# Patient Record
Sex: FEMALE | Race: WHITE | NOT HISPANIC OR LATINO | Employment: OTHER | URBAN - METROPOLITAN AREA
[De-identification: names, ages, dates, MRNs, and addresses within clinical notes are randomized per-mention and may not be internally consistent; named-entity substitution may affect disease eponyms.]

---

## 2020-02-26 ENCOUNTER — HOSPITAL ENCOUNTER (OUTPATIENT)
Dept: MEDSURG UNIT | Facility: HOSPITAL | Age: 85
End: 2020-02-28
Attending: INTERNAL MEDICINE | Admitting: INTERNAL MEDICINE

## 2020-05-30 ENCOUNTER — HISTORICAL (OUTPATIENT)
Dept: ADMINISTRATIVE | Facility: HOSPITAL | Age: 85
End: 2020-05-30

## 2022-02-02 ENCOUNTER — HISTORICAL (OUTPATIENT)
Dept: ADMINISTRATIVE | Facility: HOSPITAL | Age: 87
End: 2022-02-02

## 2022-03-03 ENCOUNTER — HISTORICAL (OUTPATIENT)
Dept: ADMINISTRATIVE | Facility: HOSPITAL | Age: 87
End: 2022-03-03

## 2022-03-16 ENCOUNTER — HISTORICAL (OUTPATIENT)
Dept: ADMINISTRATIVE | Facility: HOSPITAL | Age: 87
End: 2022-03-16

## 2022-05-01 NOTE — HISTORICAL [facility] C[facility]
This is a historical note converted from Valentina. Formatting and pictures may have been removed.  Please reference Ceramanda for original formatting and attached multimedia. 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  Admission Information  Patient is a 85 yo F with PMH of encephalitis approximately 18 years ago resulting in dementia, hypothyroidism, HTN, HLD, DM2, peripheral neuropathy, osteoporosis, depression, and GERD who presented to the [facility] ED on 2/26 after being transferred from [place]. Routine labs at her nursing home showed H/H of 6.9/23.4. She was transfused 2 units of RBCs after admission to [facility]. Patient has limited ability to provide history based on her dementia.  Significant Findings  -Patient had iron deficiency anemia on admission with low iron and ferritin levels and H/H of 6.9/23.4  -Patient initially was given 2 units of RBCs and H/H improved to 9/29.7 which remained stable after transfusion  -Patient was deemed to likely have a poor prep for colonoscopy due to her dementia so EGD was planned for 2/28 to identify potential acute bleeding sources  -EGD performed on 2/28 showed a 1 cm semi-pedunculated polyp in the antrum which was removed by snare  cautery polypectomy and retrieved with a [place] net. Linear erythema was also noted consistent with gastritis  ?  Time Spent on discharge  >30 min  Objective  Vitals & Measurements  T:?36.6? ?C (Oral)? TMIN:?36.6? ?C (Oral)? TMAX:?36.7? ?C (Oral)? HR:?61(Peripheral)? HR:?62(Monitored)? RR:?18? BP:?135/62? SpO2:?96%?  Physical Exam  See progress note from 2/28/20  Patient Discharge Condition  Stable and improved  Discharge Disposition  ?  Patient is stable for return to her nursing home and the care of her nursing home physician. Patient?and family have been recommended to have nursing home physician continue to follow patients hemoglobin levels and then consider colonoscopy if there is concern for rebleeding as the removed polyp was deemed unlikely to be a major bleeding source. Pathology from?polyp removed by EGD will be followed up on outpatient if necessary. Patient has been recommended to continue a soft diet for the remainder of today when she returns to her nursing home.  ?  ?   Discharge Medication Reconciliation  ?  Continue  alendronate (alendronate 70 mg oral tablet)?70 mg, Oral, qWeek  calcium-vitamin D (Caltrate 600+D oral tablet, chewable)?1 tab(s), Chewed, BID  citalopram (Celexa 10 mg oral tablet)?10 mg, Oral, Daily  ezetimibe-simvastatin (Vytorin 10 mg-40 mg oral tablet)?1 tab(s), Oral, Daily  gabapentin (Neurontin 100 mg oral capsule)?2 capsules, Oral, At Bedtime  levothyroxine (levothyroxine 88 mcg (0.088 mg) oral tablet)?88 mcg, Oral, Daily  lisinopril (lisinopril 5 mg oral tablet)?5 mg, Oral, Daily  metFORMIN (metFORMIN 1000 mg oral tablet)?1,000 mg, Oral, BID  metoprolol (Metoprolol Succinate ER 25 mg oral tablet extended release)?25 mg, Oral, Daily  pioglitazone (Actos 45 mg oral tablet)?45 mg, Oral, Daily  potassium chloride (potassium chloride 20 mEq oral TABLET extended release)?20 mEq, Oral, Daily  rivastigmine (Exelon 13.3 mg/24 hr transdermal film, extended release)?1 patch(es), TOP,  Daily  omeprazole (omeprazole 20 mg oral DR capsule)?20 mg, Oral, Daily  Education and Orders Provided  Esophagogastroduodenoscopy  Colonoscopy, Adult  Anemia  Discharge - 02/28/20 15:10:00 CST, Dis/Trn Another Type Inst not defined, Discharge to nursing home?  Follow up  Report to Emergency Department if symptoms return or worsen  Follow up with nursing home physician  ????  Continue to monitor patient for anemia and recommend colonoscopy if patient rebleeds

## 2022-05-01 NOTE — HISTORICAL [facility] C[facility]
This is a historical note converted from Valentina. Formatting and pictures may have been removed.  Please reference Ceramanda for original formatting and attached multimedia. Chief Complaint  Transfer from [facility] for Med/Surg admit for anemia. Pt tomy c/o  Reason for Consultation  Iron deficiency anemia  History of Present Illness  Fernanda Oconnor is a 84-year-old female with past medical history of encephalitis resulting in dementia, hypothyroidism, hypertension, hyperlipidemia, type 2 diabetes, peripheral neuropathy, osteoporosis, depression, and GERD who presented to [facility] ED on 2/26 as a transfer from [place].? Routine blood work at her nursing home revealed a H/H of 6.9/23.4, which was confirmed with labs at [facility].? She was transfused 2 units PRBCs with appropriate response in hemoglobin. Labs from this morning indicate iron deficiency anemia. GI consulted for possible EGD and colonoscopy to workup her anemia. Patient able to answer questions, but is a poor historian due to dementia. History obtained from chart review and son as a result.  ?  On exam, patient appears well. States that her BMs are the color of the foods she eats (brown, green, white). Denies any recent weight loss. Denies any nausea or vomiting. Good appetite and diet per son. 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 Never had a EGD or colonoscopy. No family?history of GI cancers or IBD.  Review of Systems  Unable to fully obtain secondary to clinical condition.  Physical Exam  Vitals & Measurements  T:?36.8? ?C (Oral)? TMIN:?36.3? ?C (Oral)? TMAX:?37.0? ?C (Oral)? HR:?73(Peripheral)? RR:?18? BP:?154/73? SpO2:?97%? WT:?83?kg? BMI:?31.63?  General: Well appearing?female in NAD. AAO x 1 (place)  HEENT: Normocephalic, atraumatic.  Neck: Supple, non-tender. No lymphadenopathy or thyromegaly.  Cardiovascular: RRR, normal S1, S2. No murmurs, rubs, or extra heart sounds noted.  Respiratory: CTABL. No rales, wheezes, or rhonchi.  Abdomen: Tender  to palpation in the RUQ, non-distended. Normoactive bowel sounds. No hepatosplenomegaly.  Extremities: No clubbing, cyanosis, or edema noted. Normal ROM.  Skin: No rashes or lesions noted.  Assessment/Plan  1. Iron deficiency anemia  -Initial H/H of 6.8/23.0, with appropriate response to transfusion  -Denies any melena, hematochezia, or hematemesis, but patient is unreliable historian  -Iron studies and severely low ferritin indicate iron deficiency anemia  -Patient has already eaten solid food today. Bowel prep would likely be poor.  -Will plan on EGD on 2/28 to evaluate  -If continues to have bleeding will consider colonoscopy as outpatient.  -Goals of care discussion held with the family. Son (medical power of )?would like all measures pursued at this time  ?  Thank you for this consultation.  ?  Angel Padilla MD  IM?[id number]  Pager [phone]   Problem List/Past Medical History  Ongoing  Alzheimer disease  Obesity  Historical  No qualifying data  Medications  Inpatient  acetaminophen, 650 mg= 2 tab(s), Oral, q6hr, PRN  acetaminophen, 1000 mg= 2 tab(s), Oral, q6hr, PRN  alendronate 70 mg oral tablet, 70 mg, Oral, qWeek  Celexa 10 mg oral tablet, 10 mg= 1 tab(s), Oral, Daily  Dextrose 50% and Water (50 mL vial/syringe), 12.5 gm= 25 mL, IV Push, Once, PRN  Dextrose 50% and Water (50 mL vial/syringe), 12.5 gm= 25 mL, IV Push, As Directed, PRN  Dextrose 50% and Water (50 mL vial/syringe), 25 gm= 50 mL, IV Push, As Directed, PRN  Dextrose 50% in Water intravenous solution, 12.5 gm= 25 mL, IV Push, As Directed, PRN  Exelon 13.3 mg/24 hr transdermal film, extended release, 13.3 mg, TOP, Daily  glucagon recombinant 1 mg injection, 1 mg= 1 EA, IM, q10min, PRN  glucagon recombinant 1 mg injection, 1 mg= 1 EA, IM, q10min, PRN  glucose 40% oral gel, 15 gm= 0.5 tube(s), Oral, As Directed, PRN  insulin lispro 100 units/mL subcutaneous injection, 2-14 units, Subcutaneous, As Directed, PRN  levothyroxine 88 mcg (0.088 mg) oral  tablet, 88 mcg= 1 tab(s), Oral, Daily  lisinopril 5 mg oral tablet, 5 mg= 1 tab(s), Oral, Daily  Lovenox, 40 mg= 0.4 mL, Subcutaneous, Daily  Metoprolol Succinate ER 25 mg oral tablet extended release, 25 mg= 1 tab(s), Oral, Daily  Neurontin 100 mg oral capsule, 200 mg= 2 cap(s), Oral, At Bedtime  potassium chloride 20 mEq oral TABLET extended release, 20 mEq= 1 tab(s), Oral, Daily  Protonix 40 mg ORAL enteric coated tablet, 40 mg= 1 tab(s), Oral, Daily  Home  Actos 45 mg oral tablet, 45 mg= 1 tab(s), Oral, Daily  alendronate 70 mg oral tablet, 70 mg= 1 tab(s), Oral, qWeek  Caltrate 600+D oral tablet, chewable, 1 tab(s), Chewed, BID  Celexa 10 mg oral tablet, 10 mg= 1 tab(s), Oral, Daily  Exelon 13.3 mg/24 hr transdermal film, extended release, 1 patch(es), TOP, Daily  levothyroxine 88 mcg (0.088 mg) oral tablet, 88 mcg= 1 tab(s), Oral, Daily  lisinopril 5 mg oral tablet, 5 mg= 1 tab(s), Oral, Daily  metFORMIN 1000 mg oral tablet, 1000 mg= 1 tab(s), Oral, BID  Metoprolol Succinate ER 25 mg oral tablet extended release, 25 mg= 1 tab(s), Oral, Daily  Neurontin 100 mg oral capsule, 2 capsules, Oral, At Bedtime  omeprazole 20 mg oral DR capsule, 20 mg= 1 cap(s), Oral, Daily  potassium chloride 20 mEq oral TABLET extended release, 20 mEq= 1 tab(s), Oral, Daily  Vytorin 10 mg-40 mg oral tablet, 1 tab(s), Oral, Daily  Allergies  No Known Medication Allergies  Social History  Abuse/Neglect  No, 02/27/2020  Alcohol  Never, 08/27/2016  Substance Use  Never, 08/27/2016  Tobacco  Never (less than 100 in lifetime), N/A, 02/27/2020  Never (less than 100 in lifetime), No, 02/26/2020  Never smoker, 08/27/2016  Lab Results  Labs Last 24 Hours?  ?Chemistry? Hematology/Coagulation?   Sodium Lvl: 139 mmol/L (02/27/20 09:18:00) PT: 12.5 second(s) (02/27/20 09:18:00)   Potassium Lvl: 4.1 mmol/L (02/27/20 09:18:00) INR: 0.94 (02/27/20 09:18:00)   Chloride:?109 mmol/L?High (02/27/20 09:18:00) PTT: 27.3 second(s) (02/26/20 21:40:00)   CO2:  26 mmol/L (02/27/20 09:18:00) WBC: 6.2 x10(3)/mcL (02/27/20 09:18:00)   Calcium Lvl: 9.3 mg/dL (02/27/20 09:18:00) RBC: 4.02 x10(6)/mcL (02/27/20 09:18:00)   Glucose Lvl:?163 mg/dL?High (02/27/20 09:18:00) Hgb:?9 gm/dL?Low (02/27/20 09:18:00)   EAG:?183 mg/dL?High (02/27/20 12:51:00) Hct:?29.7 %?Low (02/27/20 09:18:00)   BUN:?25 mg/dL?High (02/27/20 09:18:00) Platelet: 296 x10(3)/mcL (02/27/20 09:18:00)   Creatinine: 0.8 mg/dL (02/27/20 09:18:00) MCV:?73.9 fL?Low (02/27/20 09:18:00)   eGFR-AA:?88 mL/min?Low (02/27/20 09:18:00) MCH:?22.4 pg?Low (02/27/20 09:18:00)   eGFR-ANASTASIA:?73 mL/min?Low (02/27/20 09:18:00) MCHC:?30.3 gm/dL?Low (02/27/20 09:18:00)   Bili Total: 0.3 mg/dL (02/27/20 09:18:00) RDW:?21.4 %?High (02/27/20 09:18:00)   Bili Direct: 0.1 mg/dL (02/27/20 09:18:00) MPV: 10.4 fL (02/27/20 09:18:00)   Bili Indirect: 0.2 mg/dL (02/27/20 09:18:00) Abs Neut: 4.92 x10(3)/mcL (02/27/20 09:18:00)   AST:?11 unit/L?Low (02/27/20 09:18:00) Neutro Auto: 79 % (02/27/20 09:18:00)   ALT:?10 unit/L?Low (02/27/20 09:18:00) Lymph Auto: 13 % (02/27/20 09:18:00)   Alk Phos: 66 unit/L (02/27/20 09:18:00) Mono Auto: 6 % (02/27/20 09:18:00)   Total Protein:?6.2 gm/dL?Low (02/27/20 09:18:00) Eos Auto: 1 % (02/27/20 09:18:00)   Albumin Lvl:?2.9 gm/dL?Low (02/27/20 09:18:00) Abs Eos: 0.1 x10(3)/mcL (02/27/20 09:18:00)   Globulin: 3.3 gm/mL (02/27/20 09:18:00) Basophil Auto: 1 % (02/27/20 09:18:00)   A/G Ratio:?0.9 ratio?Low (02/27/20 09:18:00) Abs Neutro: 4.92 x10(3)/mcL (02/27/20 09:18:00)   LDH: 87 unit/L (02/26/20 23:50:10) Abs Lymph: 0.8 x10(3)/mcL (02/27/20 09:18:00)   NT pro BNP.:?1062 pg/mL?High (02/27/20 12:51:00) Abs Mono: 0.4 x10(3)/mcL (02/27/20 09:18:00)   Iron Lvl:?14 mcg/dL?Low (02/26/20 23:50:10) Abs Baso: 0 x10(3)/mcL (02/27/20 09:18:00)   Transferrin: 277 mg/dL (02/26/20 23:50:10) IG%: 0 % (02/27/20 09:18:00)   TIBC: 363 mcg/dL (02/26/20 23:50:10) IG#: 0.02 (02/27/20 09:18:00)   Iron Sat:?3.9 %?Low (02/26/20 23:50:10)  Retic Cnt Auto: 1 % (02/26/20 21:40:00)   Ferritin Lvl:?5.2 ng/mL?Low (02/26/20 21:40:00) RET#: 0.03 x10(6)/mcL (02/26/20 21:40:00)   Folate Lvl: 9.4 ng/mL (02/26/20 23:50:10)    Hgb A1c:?8 %?High (02/27/20 12:51:00)    Vitamin B12 Lvl: 732 pg/mL (02/26/20 23:50:10)    Haptoglobin:?206 mg/dL?High (02/26/20 23:50:10)    TSH: 2.307 mIU/L (02/27/20 12:51:00)    Diagnostic Results  Accession:?[id number]  Order:?XR Chest 2 Views  Report Dt/Tm:?02/27/2020 12:00  Report:?  ?  History:  Cardiomegaly  ?  Reference:  Yesterday  ?  Findings:  Frontal and lateral views of the chest were obtained. Stable  cardiomegaly. There are mild chronic changes of the lungs. No  consolidation suspicious for pneumonia. No pneumothorax.?  ?  Impression:?  No acute cardiopulmonary abnormality.  ?  ?  ?  ?  ?  Accession:?[id number]  Order:?XR Chest 1 View  Report Dt/Tm:?02/27/2020 09:13  Report:?  ?  1 view chest  ?  INDICATION: Dyspnea  ?  COMPARISON: 10/23/2007  ?  FINDINGS:?  ?  No focal consolidation, pleural effusion or pneumothorax. Cardiac  silhouette is enlarged. Pulmonary vasculature is normal.  ?  IMPRESSION:  ?  No acute findings in the chest  ?

## 2022-06-01 ENCOUNTER — HOSPITAL ENCOUNTER (EMERGENCY)
Facility: HOSPITAL | Age: 87
Discharge: ANOTHER HOSPITAL | End: 2022-06-01
Attending: FAMILY MEDICINE
Payer: MEDICARE

## 2022-06-01 VITALS
OXYGEN SATURATION: 97 % | SYSTOLIC BLOOD PRESSURE: 134 MMHG | WEIGHT: 200 LBS | TEMPERATURE: 99 F | HEART RATE: 75 BPM | BODY MASS INDEX: 32.14 KG/M2 | HEIGHT: 66 IN | DIASTOLIC BLOOD PRESSURE: 53 MMHG | RESPIRATION RATE: 18 BRPM

## 2022-06-01 DIAGNOSIS — N28.9 ACUTE RENAL INSUFFICIENCY: ICD-10-CM

## 2022-06-01 DIAGNOSIS — D62 ACUTE BLOOD LOSS ANEMIA: ICD-10-CM

## 2022-06-01 DIAGNOSIS — D64.9 SYMPTOMATIC ANEMIA: Primary | ICD-10-CM

## 2022-06-01 DIAGNOSIS — Z86.79 HISTORY OF CONGESTIVE HEART FAILURE: ICD-10-CM

## 2022-06-01 LAB
ABO + RH BLD: NORMAL
ABORH RETYPE: NORMAL
ALBUMIN SERPL-MCNC: 3.2 GM/DL (ref 3.4–4.8)
ALBUMIN/GLOB SERPL: 1.1 RATIO (ref 1.1–2)
ALP SERPL-CCNC: 68 UNIT/L (ref 40–150)
ALT SERPL-CCNC: <5 UNIT/L (ref 0–55)
AST SERPL-CCNC: 13 UNIT/L (ref 5–34)
BASOPHILS # BLD AUTO: 0.04 X10(3)/MCL (ref 0–0.2)
BASOPHILS NFR BLD AUTO: 0.7 %
BILIRUBIN DIRECT+TOT PNL SERPL-MCNC: 0.2 MG/DL
BLD PROD TYP BPU: NORMAL
BLOOD UNIT EXPIRATION DATE: NORMAL
BLOOD UNIT TYPE CODE: 5100
BUN SERPL-MCNC: 56 MG/DL (ref 9.8–20.1)
CALCIUM SERPL-MCNC: 10.7 MG/DL (ref 8.4–10.2)
CHLORIDE SERPL-SCNC: 107 MMOL/L (ref 98–107)
CO2 SERPL-SCNC: 21 MMOL/L (ref 23–31)
CREAT SERPL-MCNC: 1.88 MG/DL (ref 0.55–1.02)
CROSSMATCH INTERPRETATION: NORMAL
DISPENSE STATUS: NORMAL
EOSINOPHIL # BLD AUTO: 0.21 X10(3)/MCL (ref 0–0.9)
EOSINOPHIL NFR BLD AUTO: 3.4 %
ERYTHROCYTE [DISTWIDTH] IN BLOOD BY AUTOMATED COUNT: 20.6 % (ref 11.5–17)
FERRITIN SERPL-MCNC: 13.7 NG/ML (ref 4.63–204)
GLOBULIN SER-MCNC: 3 GM/DL (ref 2.4–3.5)
GLUCOSE SERPL-MCNC: 94 MG/DL (ref 82–115)
GROUP & RH: NORMAL
HCT VFR BLD AUTO: 22.2 % (ref 37–47)
HEMOCCULT SP1 STL QL: NEGATIVE
HGB BLD-MCNC: 6.7 GM/DL (ref 12–16)
IMM GRANULOCYTES # BLD AUTO: 0.03 X10(3)/MCL (ref 0–0.02)
IMM GRANULOCYTES NFR BLD AUTO: 0.5 % (ref 0–0.43)
INDIRECT COOMBS GEL: NORMAL
IRON SATN MFR SERPL: 3 % (ref 20–50)
IRON SERPL-MCNC: 8 UG/DL (ref 50–170)
LYMPHOCYTES # BLD AUTO: 0.88 X10(3)/MCL (ref 0.6–4.6)
LYMPHOCYTES NFR BLD AUTO: 14.3 %
MCH RBC QN AUTO: 26.8 PG (ref 27–31)
MCHC RBC AUTO-ENTMCNC: 30.2 MG/DL (ref 33–36)
MCV RBC AUTO: 88.8 FL (ref 80–94)
MONOCYTES # BLD AUTO: 0.6 X10(3)/MCL (ref 0.1–1.3)
MONOCYTES NFR BLD AUTO: 9.8 %
NEUTROPHILS # BLD AUTO: 4.4 X10(3)/MCL (ref 2.1–9.2)
NEUTROPHILS NFR BLD AUTO: 71.3 %
PLATELET # BLD AUTO: 236 X10(3)/MCL (ref 130–400)
PMV BLD AUTO: 10.6 FL (ref 9.4–12.4)
POTASSIUM SERPL-SCNC: 4.9 MMOL/L (ref 3.5–5.1)
PROT SERPL-MCNC: 6.2 GM/DL (ref 5.8–7.6)
RBC # BLD AUTO: 2.5 X10(6)/MCL (ref 4.2–5.4)
RET# (OHS): 0.07 (ref 0.02–0.08)
RETICULOCYTE COUNT AUTOMATED (OLG): 2.5 % (ref 1.1–2.1)
SODIUM SERPL-SCNC: 140 MMOL/L (ref 136–145)
TIBC SERPL-MCNC: 312 UG/DL (ref 70–310)
TIBC SERPL-MCNC: 320 UG/DL (ref 250–450)
UNIT NUMBER: NORMAL
WBC # SPEC AUTO: 6.2 X10(3)/MCL (ref 4.5–11.5)

## 2022-06-01 PROCEDURE — 85045 AUTOMATED RETICULOCYTE COUNT: CPT | Performed by: INTERNAL MEDICINE

## 2022-06-01 PROCEDURE — 86850 RBC ANTIBODY SCREEN: CPT | Performed by: INTERNAL MEDICINE

## 2022-06-01 PROCEDURE — 25000003 PHARM REV CODE 250: Performed by: INTERNAL MEDICINE

## 2022-06-01 PROCEDURE — 99285 EMERGENCY DEPT VISIT HI MDM: CPT | Mod: 25

## 2022-06-01 PROCEDURE — 80053 COMPREHEN METABOLIC PANEL: CPT | Performed by: NURSE PRACTITIONER

## 2022-06-01 PROCEDURE — 83540 ASSAY OF IRON: CPT | Performed by: INTERNAL MEDICINE

## 2022-06-01 PROCEDURE — 36415 COLL VENOUS BLD VENIPUNCTURE: CPT | Performed by: NURSE PRACTITIONER

## 2022-06-01 PROCEDURE — 85025 COMPLETE CBC W/AUTO DIFF WBC: CPT | Performed by: NURSE PRACTITIONER

## 2022-06-01 PROCEDURE — 82272 OCCULT BLD FECES 1-3 TESTS: CPT | Performed by: NURSE PRACTITIONER

## 2022-06-01 PROCEDURE — 82728 ASSAY OF FERRITIN: CPT | Performed by: INTERNAL MEDICINE

## 2022-06-01 PROCEDURE — P9016 RBC LEUKOCYTES REDUCED: HCPCS | Performed by: INTERNAL MEDICINE

## 2022-06-01 PROCEDURE — 36430 TRANSFUSION BLD/BLD COMPNT: CPT

## 2022-06-01 PROCEDURE — 86920 COMPATIBILITY TEST SPIN: CPT | Performed by: INTERNAL MEDICINE

## 2022-06-01 RX ORDER — ALENDRONATE SODIUM 70 MG/1
70 TABLET ORAL
COMMUNITY

## 2022-06-01 RX ORDER — SPIRONOLACTONE 25 MG/1
25 TABLET ORAL DAILY
COMMUNITY

## 2022-06-01 RX ORDER — GLIMEPIRIDE 1 MG/1
1 TABLET ORAL
COMMUNITY

## 2022-06-01 RX ORDER — HYDROCODONE BITARTRATE AND ACETAMINOPHEN 500; 5 MG/1; MG/1
TABLET ORAL
Status: DISCONTINUED | OUTPATIENT
Start: 2022-06-01 | End: 2022-06-02 | Stop reason: HOSPADM

## 2022-06-01 RX ORDER — FUROSEMIDE 20 MG/1
20 TABLET ORAL 2 TIMES DAILY
COMMUNITY

## 2022-06-01 RX ORDER — IPRATROPIUM BROMIDE AND ALBUTEROL SULFATE 2.5; .5 MG/3ML; MG/3ML
3 SOLUTION RESPIRATORY (INHALATION) EVERY 6 HOURS PRN
COMMUNITY

## 2022-06-01 RX ORDER — CARVEDILOL 3.12 MG/1
3.12 TABLET ORAL 2 TIMES DAILY WITH MEALS
COMMUNITY

## 2022-06-01 RX ORDER — IBUPROFEN 100 MG/5ML
1000 SUSPENSION, ORAL (FINAL DOSE FORM) ORAL DAILY
COMMUNITY

## 2022-06-01 RX ORDER — CALCIUM CARBONATE 600 MG
600 TABLET ORAL ONCE
COMMUNITY

## 2022-06-01 RX ORDER — PANTOPRAZOLE SODIUM 20 MG/1
20 TABLET, DELAYED RELEASE ORAL DAILY
COMMUNITY

## 2022-06-01 RX ORDER — RIVASTIGMINE 9.5 MG/24H
1 PATCH, EXTENDED RELEASE TRANSDERMAL DAILY
COMMUNITY
End: 2022-06-01 | Stop reason: CLARIF

## 2022-06-01 RX ORDER — RIVASTIGMINE 13.3 MG/24H
PATCH, EXTENDED RELEASE TRANSDERMAL
COMMUNITY

## 2022-06-01 RX ORDER — EZETIMIBE AND SIMVASTATIN 10; 40 MG/1; MG/1
1 TABLET ORAL NIGHTLY
COMMUNITY

## 2022-06-01 RX ORDER — CITALOPRAM 10 MG/1
10 TABLET ORAL DAILY
COMMUNITY

## 2022-06-01 RX ORDER — MEMANTINE HYDROCHLORIDE 28 MG/1
CAPSULE, EXTENDED RELEASE ORAL
COMMUNITY

## 2022-06-01 RX ORDER — LEVOTHYROXINE SODIUM 88 UG/1
88 TABLET ORAL
COMMUNITY

## 2022-06-01 RX ORDER — MULTIVITAMIN
1 TABLET ORAL DAILY
COMMUNITY

## 2022-06-01 RX ORDER — METFORMIN HYDROCHLORIDE 500 MG/1
500 TABLET ORAL 2 TIMES DAILY WITH MEALS
COMMUNITY

## 2022-06-01 RX ORDER — GABAPENTIN 100 MG/1
100 CAPSULE ORAL NIGHTLY
COMMUNITY

## 2022-06-01 RX ADMIN — SODIUM CHLORIDE: 9 INJECTION, SOLUTION INTRAVENOUS at 08:06

## 2022-06-01 NOTE — ED PROVIDER NOTES
Encounter Date: 6/1/2022       History     Chief Complaint   Patient presents with    check up      Patient dropped off by [facility] for unknown reason. Patient states she has no complaints and feels fine today.    abnormal labs     Spoke with pt nurse at [facility] and she states pt sent for low H&H. No clear sign of where the patient is bleeding. H&H 6.9/23.1     Patient is an 86-year-old female who was transferred to us from [facility] for evaluation of a low hemoglobin and hematocrit.  She denies any symptoms of chest pain, no shortness of breath, no fever, no abdominal pain, no associated symptoms.  She is in no acute distress at this time.  She does have a history of acute anemia, she was transfused, Dr. Becerra states that she has never had a complete GI workup.          Review of patient's allergies indicates:  No Known Allergies  No past medical history on file.  No past surgical history on file.  No family history on file.  Social History     Tobacco Use    Smoking status: Never Smoker    Smokeless tobacco: Never Used     Review of Systems   All other systems reviewed and are negative.      Physical Exam     Initial Vitals [06/01/22 1529]   BP Pulse Resp Temp SpO2   136/74 68 18 98.8 °F (37.1 °C) 98 %      MAP       --         Physical Exam    Nursing note and vitals reviewed.  Constitutional: She appears well-developed and well-nourished.   HENT:   Head: Normocephalic.   Eyes: Conjunctivae are normal.   Neck: Neck supple.   Cardiovascular: Normal rate.   Pulmonary/Chest: Breath sounds normal.   Abdominal: Abdomen is soft. Bowel sounds are normal.   Musculoskeletal:         General: Normal range of motion.      Cervical back: Neck supple.     Neurological: She is alert and oriented to person, place, and time. She has normal strength.   Skin: Skin is warm and dry. There is pallor.   Psychiatric: She has a normal mood and affect. Her behavior is normal. Judgment and thought content  normal.         ED Course   Procedures  Labs Reviewed   CBC WITH DIFFERENTIAL - Abnormal; Notable for the following components:       Result Value    RBC 2.50 (*)     Hgb 6.7 (*)     Hct 22.2 (*)     MCH 26.8 (*)     MCHC 30.2 (*)     RDW 20.6 (*)     IG# 0.03 (*)     IG% 0.5 (*)     All other components within normal limits   COMPREHENSIVE METABOLIC PANEL - Abnormal; Notable for the following components:    Carbon Dioxide 21 (*)     Blood Urea Nitrogen 56.0 (*)     Creatinine 1.88 (*)     Calcium Level Total 10.7 (*)     Albumin Level 3.2 (*)     All other components within normal limits   IRON AND TIBC - Abnormal; Notable for the following components:    Iron Binding Capacity Unsaturated 312 (*)     Iron Level 8 (*)     Iron Saturation 3 (*)     All other components within normal limits   RETICULOCYTES - Abnormal; Notable for the following components:    Retic Cnt Auto 2.50 (*)     All other components within normal limits   OCCULT BLOOD X 3, STOOL - Normal   FERRITIN - Normal   CBC W/ AUTO DIFFERENTIAL    Narrative:     The following orders were created for panel order CBC auto differential.  Procedure                               Abnormality         Status                     ---------                               -----------         ------                     CBC with Differential[[id number]]        Abnormal            Final result                 Please view results for these tests on the individual orders.   OCCULT BLOOD,STOOL,DIAGNOSTIC (1-3)    Narrative:     The following orders were created for panel order Occult Blood, Stool, Diagnostic (1-3).  Procedure                               Abnormality         Status                     ---------                               -----------         ------                     Occult blood x 3, stool[[id number]]      Normal              Final result               Occult Blood, Stool 2nd ...[[id number]]                                                 Occult Blood,  Stool 3rd ...[[id number]]                                                   Please view results for these tests on the individual orders.   OCCULT BLOOD, STOOL 2ND SPECIMEN   OCCULT BLOOD, STOOL 3RD SPECIMEN   TYPE & SCREEN   ABORH RETYPE   PREPARE RBC SOFT     Admission on 06/01/2022   Component Date Value Ref Range Status    WBC 06/01/2022 6.2  4.5 - 11.5 x10(3)/mcL Final    RBC 06/01/2022 2.50 (A) 4.20 - 5.40 x10(6)/mcL Final    Hgb 06/01/2022 6.7 (A) 12.0 - 16.0 gm/dL Final    Hct 06/01/2022 22.2 (A) 37.0 - 47.0 % Final    MCV 06/01/2022 88.8  80.0 - 94.0 fL Final    MCH 06/01/2022 26.8 (A) 27.0 - 31.0 pg Final    MCHC 06/01/2022 30.2 (A) 33.0 - 36.0 mg/dL Final    RDW 06/01/2022 20.6 (A) 11.5 - 17.0 % Final    Platelet 06/01/2022 236  130 - 400 x10(3)/mcL Final    MPV 06/01/2022 10.6  9.4 - 12.4 fL Final    Neut % 06/01/2022 71.3  % Final    Lymph % 06/01/2022 14.3  % Final    Mono % 06/01/2022 9.8  % Final    Eos % 06/01/2022 3.4  % Final    Basophil % 06/01/2022 0.7  % Final    Lymph # 06/01/2022 0.88  0.6 - 4.6 x10(3)/mcL Final    Neut # 06/01/2022 4.4  2.1 - 9.2 x10(3)/mcL Final    Mono # 06/01/2022 0.60  0.1 - 1.3 x10(3)/mcL Final    Eos # 06/01/2022 0.21  0 - 0.9 x10(3)/mcL Final    Baso # 06/01/2022 0.04  0 - 0.2 x10(3)/mcL Final    IG# 06/01/2022 0.03 (A) 0 - 0.0155 x10(3)/mcL Final    IG% 06/01/2022 0.5 (A) 0 - 0.43 % Final    Occult Blood Stool 1 06/01/2022 Negative  Negative Final    Sodium Level 06/01/2022 140  136 - 145 mmol/L Final    Potassium Level 06/01/2022 4.9  3.5 - 5.1 mmol/L Final    Chloride 06/01/2022 107  98 - 107 mmol/L Final    Carbon Dioxide 06/01/2022 21 (A) 23 - 31 mmol/L Final    Glucose Level 06/01/2022 94  82 - 115 mg/dL Final    Blood Urea Nitrogen 06/01/2022 56.0 (A) 9.8 - 20.1 mg/dL Final    Creatinine 06/01/2022 1.88 (A) 0.55 - 1.02 mg/dL Final    Calcium Level Total 06/01/2022 10.7 (A) 8.4 - 10.2 mg/dL Final    Protein Total 06/01/2022 6.2   5.8 - 7.6 gm/dL Final    Albumin Level 06/01/2022 3.2 (A) 3.4 - 4.8 gm/dL Final    Globulin 06/01/2022 3.0  2.4 - 3.5 gm/dL Final    Albumin/Globulin Ratio 06/01/2022 1.1  1.1 - 2.0 ratio Final    Bilirubin Total 06/01/2022 0.2  <=1.5 mg/dL Final    Alkaline Phosphatase 06/01/2022 68  40 - 150 unit/L Final    Alanine Aminotransferase 06/01/2022 <5  0 - 55 unit/L Final    Aspartate Aminotransferase 06/01/2022 13  5 - 34 unit/L Final    Estimated GFR-Non  06/01/2022 27  mls/min/1.73/m2 Final    UNIT NUMBER 06/01/2022 [id number]   Preliminary    UNIT ABO/RH 06/01/2022 O POS   Preliminary    DISPENSE STATUS 06/01/2022 Selected   Preliminary    Unit Expiration 06/01/2022 [id number]   Preliminary    Product Code 06/01/2022 F4111F18   Preliminary    Unit Blood Type Code 06/01/2022 5100   Preliminary    CROSSMATCH INTERPRETATION 06/01/2022 Compatible   Preliminary    Group & Rh 06/01/2022 O POS   Final    Indirect Najma GEL 06/01/2022 NEG   Final    Iron Binding Capacity Unsaturated 06/01/2022 312 (A) 70 - 310 ug/dL Final    Iron Level 06/01/2022 8 (A) 50 - 170 ug/dL Final    Iron Binding Capacity Total 06/01/2022 320  250 - 450 ug/dL Final    Iron Saturation 06/01/2022 3 (A) 20 - 50 % Final    Ferritin Level 06/01/2022 13.70  4.63 - 204.00 ng/mL Final    Retic Cnt Auto 06/01/2022 2.50 (A) 1.1 - 2.1 % Final    RET# 06/01/2022 0.0688  0.016 - 0.078 Final    ABORH Retype 06/01/2022 O POS   Final            Imaging Results    None          Medications   0.9%  NaCl infusion (for blood administration) (has no administration in time range)                 ED Course as of 06/01/22 2013 Wed Jun 01, 2022   1730 Physical examination unremarkable [EB]   1752 Rectal examination performed, good sphincter tone, no magdaleno blood appreciated, clear rectal vault [EB]   1820 I spoke with Dr. Becerra, we were reviewed all pertinent information about the case, it was recommended that we transfer her  for GI services. [EB]      ED Course User Index  [EB] KARELY Moe             Clinical Impression:   Final diagnoses:  [D64.9] Symptomatic anemia (Primary)  [D62] Acute blood loss anemia  [N28.9] Acute renal insufficiency  [Z86.79] History of congestive heart failure          ED Disposition Condition    Transfer to Another Facility Stable          OFE WARD MD, assumed care at 1825.  Agree with above care plan and documentation.  Patient seen and examined.  Patient has a significant history of congestive heart failure with low ejection fraction below 35%.  Medical history significant for last time she required blood she went into congestive heart failure upon speaking to Dr. Anand zhang he requests that she be transferred to somewhere with GI and cardiology services.      2005:  Spoke with the Dr. Harris at our [facility] ER in [place] who accepts as ER to ER transfer    Suzy Ibrahim is a certified MA and was present during the entire interaction with this patient        Martinez Burleson MD  06/01/22 2013

## 2022-06-02 NOTE — ED NOTES
Assumed care from SRI Tan. Pt is AAOx4, sitting up in bed eating, son at bedside. NAD is noted at time of assessment. Will continue to monitor.

## 2022-06-23 DIAGNOSIS — M25.511 SHOULDER PAIN, RIGHT: Primary | ICD-10-CM

## 2022-06-28 ENCOUNTER — HOSPITAL ENCOUNTER (OUTPATIENT)
Dept: RADIOLOGY | Facility: HOSPITAL | Age: 87
Discharge: HOME | End: 2022-06-28
Attending: SPECIALIST
Payer: MEDICARE

## 2022-06-28 DIAGNOSIS — M25.511 SHOULDER PAIN, RIGHT: ICD-10-CM

## 2022-06-28 PROCEDURE — 73200 CT UPPER EXTREMITY W/O DYE: CPT | Mod: TC,RT

## 2022-09-05 ENCOUNTER — HOSPITAL ENCOUNTER (EMERGENCY)
Facility: HOSPITAL | Age: 87
Discharge: HOME | End: 2022-09-05
Attending: EMERGENCY MEDICINE
Payer: MEDICARE

## 2022-09-05 VITALS
OXYGEN SATURATION: 96 % | DIASTOLIC BLOOD PRESSURE: 81 MMHG | HEIGHT: 64 IN | SYSTOLIC BLOOD PRESSURE: 160 MMHG | BODY MASS INDEX: 37.73 KG/M2 | TEMPERATURE: 99 F | HEART RATE: 89 BPM | RESPIRATION RATE: 18 BRPM | WEIGHT: 221 LBS
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
## 2022-09-05 DIAGNOSIS — S32.810A MULTIPLE CLOSED FRACTURES OF PELVIS WITH STABLE DISRUPTION OF PELVIC RING, INITIAL ENCOUNTER: Primary | ICD-10-CM

## 2022-09-05 PROCEDURE — 63600175 PHARM REV CODE 636 W HCPCS: Performed by: EMERGENCY MEDICINE

## 2022-09-05 PROCEDURE — 96374 THER/PROPH/DIAG INJ IV PUSH: CPT

## 2022-09-05 PROCEDURE — 99285 EMERGENCY DEPT VISIT HI MDM: CPT | Mod: 25

## 2022-09-05 RX ORDER — HYDROCODONE BITARTRATE AND ACETAMINOPHEN 5; 325 MG/1; MG/1
1 TABLET ORAL EVERY 6 HOURS PRN
Qty: 18 TABLET | Refills: 0 | Status: SHIPPED | OUTPATIENT
Start: 2022-09-05

## 2022-09-05 RX ORDER — FENTANYL CITRATE 50 UG/ML
INJECTION, SOLUTION INTRAMUSCULAR; INTRAVENOUS
Status: DISCONTINUED
Start: 2022-09-05 | End: 2022-09-05 | Stop reason: WASHOUT

## 2022-09-05 RX ORDER — FENTANYL CITRATE 50 UG/ML
50 INJECTION, SOLUTION INTRAMUSCULAR; INTRAVENOUS
Status: COMPLETED | OUTPATIENT
Start: 2022-09-05 | End: 2022-09-05

## 2022-09-05 RX ADMIN — FENTANYL CITRATE 50 MCG: 50 INJECTION, SOLUTION INTRAMUSCULAR; INTRAVENOUS at 09:09

## 2022-09-05 NOTE — ED PROVIDER NOTES
Encounter Date: 9/5/2022       History     Chief Complaint   Patient presents with    Fall     Lt hip pain     86-year-old female fell out of her wheelchair yesterday and had no complaints of pain yesterday, but today began complaining of left hip pain.  Nursing home personnel state that she tends to yell out which when touched anywhere and she is hard to assess for this reason.  She has not been ambulatory for the last 15 years, and will sit up in bed and transfer to a wheelchair but only with assistance.  She was unable to sit up today due to severe pain in her left hip.  She does have a history of surgery to that hip.      Review of patient's allergies indicates:  No Known Allergies  Past Medical History:   Diagnosis Date    Bedbound     Diabetes mellitus     Hypertension     Unspecified dementia without behavioral disturbance      Past Surgical History:   Procedure Laterality Date    left hip Left      No family history on file.  Social History     Tobacco Use    Smoking status: Never    Smokeless tobacco: Never     Review of Systems   Musculoskeletal:  Positive for arthralgias.   All other systems reviewed and are negative.    Physical Exam     Initial Vitals [09/05/22 0806]   BP Pulse Resp Temp SpO2   (!) 172/71 84 18 98.6 °F (37 °C) (!) 94 %      MAP       --         Physical Exam    Nursing note and vitals reviewed.  Constitutional: She appears well-developed and well-nourished. She is not diaphoretic. No distress.   HENT:   Head: Normocephalic and atraumatic.   Mouth/Throat: Oropharynx is clear and moist.   Eyes: Conjunctivae are normal. Pupils are equal, round, and reactive to light.   Neck: Neck supple.   Cardiovascular:  Normal rate, regular rhythm, normal heart sounds and intact distal pulses.           Pulmonary/Chest: Breath sounds normal. No respiratory distress. She has no wheezes. She has no rhonchi. She has no rales.   Abdominal: Abdomen is soft. Bowel sounds are normal. She exhibits no  distension. There is no abdominal tenderness. There is no guarding.   Musculoskeletal:         General: No tenderness or edema. Normal range of motion.      Cervical back: Neck supple.      Comments: Tender to the left hip and screams in pain when you move her left leg pointing to the left hip as the source of pain.  Mildly tender to the left knee but there is no bruising or swelling noted to her left leg.  Nontender as best I can tell to the rest of her extremities.     Neurological: She is alert.   Mental status is at baseline, answers simple questions inconsistently   Skin: Skin is warm and dry. Capillary refill takes less than 2 seconds. No rash noted.   Psychiatric: Thought content normal.       ED Course   Procedures  Labs Reviewed - No data to display       Imaging Results              CT Pelvis Without Contrast (Final result)  Result time 09/05/22 10:13:01      Final result by Derek Brizuela MD (09/05/22 10:13:01)                   Impression:      1. Nondisplaced fracture of the left inferior pubic ramus.  2. Minimally comminuted fracture of the left acetabular roof extending to the medial and posterior walls of the acetabulum as well as extending superiorly to the left iliac wing.      Electronically signed by: Derek Brizuela MD  Date:    09/05/2022  Time:    10:13               Narrative:    EXAMINATION:  CT PELVIS WITHOUT CONTRAST    CLINICAL HISTORY:  pelvic fracture;    TECHNIQUE:  Axial images of the pelvis were obtained without IV contrast administration.  Coronal and sagittal reconstructions were provided.  Three dimensional and MIP images were obtained and evaluated.  Total DLP was 604 mGy-cm. Dose lowering technique and automated exposure control were utilized for this exam.    COMPARISON:  Left hip radiograph 09/05/2022.    FINDINGS:  There is a nondisplaced fracture of the left inferior pubic ramus.  There is a minimally comminuted fracture of the left acetabular roof.  This extends inferiorly to the  medial and posterior walls of the acetabulum.  There is no bony mass lesion.  The acetabular fracture extends superiorly to the left iliac wing.  There is no involvement of the sacroiliac joint.  The femoral head is intact.  There are postsurgical changes following femoral fixation.  There is no periprosthetic fracture.  The pubic symphysis is well aligned.    There is lower lumbar degenerative change.  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  The included small bowel and colon are normal.  The urinary bladder is normal.  There is no pelvic or inguinal adenopathy.                                       X-Ray Femur Ap/Lat Left (Final result)  Result time 09/05/22 09:02:37      Final result by Derek Brizuela MD (09/05/22 09:02:37)                   Impression:      Postsurgical changes in the left femur without acute abnormality.      Electronically signed by: Derek Brizuela MD  Date:    09/05/2022  Time:    09:02               Narrative:    EXAMINATION:  Two radiographic views of the LEFT FEMUR.    CLINICAL HISTORY:  Unspecified fall, initial encounter    TECHNIQUE:  Two radiographic views of the LEFT FEMUR.    COMPARISON:  None.    FINDINGS:  Frontal lateral views of the left femur demonstrate postoperative changes following plate and screw fixation.  There is no periprosthetic fracture.  There is no soft tissue swelling.                                       X-Ray Hip 2 or 3 views Left (with Pelvis when performed) (Final result)  Result time 09/05/22 09:02:05      Final result by Derek Brizuela MD (09/05/22 09:02:05)                   Impression:      Irregularity of the left inferior pubic ramus suspicious for fracture.      Electronically signed by: Derek Brizuela MD  Date:    09/05/2022  Time:    09:02               Narrative:    EXAMINATION:  Four radiographic views of the LEFT HIP.    CLINICAL HISTORY:  fall on left hip;    TECHNIQUE:  Four radiographic views of the LEFT HIP.    COMPARISON:  None.    FINDINGS:  AP  view of the pelvis with three views of the left hip demonstrate postoperative changes following fixation of the proximal femur.  There is no periprosthetic fracture.  There is irregularity of the left inferior pubic ramus.  There is moderate degenerative change of the bilateral hips.  There are calcified phleboliths in the pelvis.                                       X-Ray Knee Complete 4 or More Views Left (Final result)  Result time 09/05/22 09:00:52      Final result by Derek Brizuela MD (09/05/22 09:00:52)                   Impression:      No acute abnormality of the left knee.      Electronically signed by: Derek Brizuela MD  Date:    09/05/2022  Time:    09:00               Narrative:    EXAMINATION:  Four radiographic views of the KNEE.    CLINICAL HISTORY:  fall;    TECHNIQUE:  Four radiographic views of the KNEE    COMPARISON:  None.    FINDINGS:  Four views of the left knee demonstrate normal alignment.  There is no fracture.  There is incompletely visualized fixation hardware in the proximal femur.  There is no joint effusion.  There is no soft tissue swelling.                                       Medications   fentaNYL injection 50 mcg (50 mcg Intravenous Given 9/5/22 0916)     Medical Decision Making:   Initial Assessment:   86-year-old female with dementia fell yesterday and today is complaining of left hip pain  Differential Diagnosis:   Hip fracture, sacral fracture, spinal fracture, bruising, contusion  Clinical Tests:   Radiological Study: Reviewed  ED Management:  Patient was given fentanyl for pain.  Her son is present who is aware of the situation and is aware that she is not an appropriate surgical candidate.  Considering she does not weightbear, he does not want her to have surgery even if it was recommended.  Other:   I have discussed this case with another health care provider.       <> Summary of the Discussion: Case discussed with Dr. Braxton on-call for Orthopedics surgery.  Patient has been  nonweightbearing for the last 15 years and only gets out of bed to transfer to a wheelchair with assistance.  At this time she has pelvic fractures which are minimally displaced.  There is no indication for surgery at this time considering we are not attempting to fix these fractures to get her back to walking.  In addition she would be very high risk for surgery.  She can be discharged back to the nursing home with recommendations that she not weightbear on the left leg when she attempts to transfer and with pain management.  Dr. Braxton states that he will see her in his office in 2 weeks to repeat x-rays and to see how she is healing.                    Clinical Impression:   Final diagnoses:  [W19.XXXA] Fall  [S32.810A] Multiple closed fractures of pelvis with stable disruption of pelvic ring, initial encounter (Primary)      ED Disposition Condition    Discharge Stable          ED Prescriptions       Medication Sig Dispense Start Date End Date Auth. Provider    HYDROcodone-acetaminophen (NORCO) 5-325 mg per tablet Take 1 tablet by mouth every 6 (six) hours as needed for Pain. 18 tablet 9/5/2022 -- Anita Clifford MD          Follow-up Information       Follow up With Specialties Details Why Contact Info    Rancho Braxton MD Orthopedic Surgery Schedule an appointment as soon as possible for a visit in 2 weeks  [address] [facility] [address] [phone]               Anita Clifford MD  09/05/22 6006

## 2022-09-05 NOTE — ED TRIAGE NOTES
Here via [facility] from [facility] in [place] after reported fall 9/4. Now lt hip/upper leg pain reported. Pt hx of dementia. Fentanyl 100 mcg total per ems

## 2022-09-20 ENCOUNTER — OFFICE VISIT (OUTPATIENT)
Dept: ORTHOPEDICS | Facility: CLINIC | Age: 87
End: 2022-09-20
Payer: MEDICARE

## 2022-09-20 ENCOUNTER — HOSPITAL ENCOUNTER (OUTPATIENT)
Dept: RADIOLOGY | Facility: CLINIC | Age: 87
Discharge: HOME | End: 2022-09-20
Attending: ORTHOPAEDIC SURGERY
Payer: MEDICARE

## 2022-09-20 VITALS
HEIGHT: 64 IN | HEART RATE: 89 BPM | WEIGHT: 221 LBS | SYSTOLIC BLOOD PRESSURE: 160 MMHG | BODY MASS INDEX: 37.73 KG/M2 | DIASTOLIC BLOOD PRESSURE: 81 MMHG

## 2022-09-20 DIAGNOSIS — S32.810A CLOSED PELVIC RING FRACTURE, INITIAL ENCOUNTER: ICD-10-CM

## 2022-09-20 DIAGNOSIS — S32.442A: ICD-10-CM

## 2022-09-20 DIAGNOSIS — S32.810A CLOSED PELVIC RING FRACTURE, INITIAL ENCOUNTER: Primary | ICD-10-CM

## 2022-09-20 DIAGNOSIS — S32.432A: ICD-10-CM

## 2022-09-20 PROCEDURE — 72190 XR PELVIS AP INLET AND OUTLET: ICD-10-PCS | Mod: ,,, | Performed by: ORTHOPAEDIC SURGERY

## 2022-09-20 PROCEDURE — 99203 OFFICE O/P NEW LOW 30 MIN: CPT | Mod: 25,,, | Performed by: NURSE PRACTITIONER

## 2022-09-20 PROCEDURE — 99203 PR OFFICE/OUTPT VISIT, NEW, LEVL III, 30-44 MIN: ICD-10-PCS | Mod: 25,,, | Performed by: NURSE PRACTITIONER

## 2022-09-20 PROCEDURE — 72190 X-RAY EXAM OF PELVIS: CPT | Mod: ,,, | Performed by: ORTHOPAEDIC SURGERY

## 2022-09-20 PROCEDURE — 27220 PR CLOSED RX ACETABULAR FX: ICD-10-PCS | Mod: LT,,, | Performed by: NURSE PRACTITIONER

## 2022-09-20 PROCEDURE — 27220 TREAT HIP SOCKET FRACTURE: CPT | Mod: LT,,, | Performed by: NURSE PRACTITIONER

## 2022-09-20 NOTE — PROGRESS NOTES
Subjective:       Patient ID: Fernanda Oconnor is a 86 y.o. female.    Chief Complaint   Patient presents with    Pelvis - Injury    Injury     2 weeks f/u pelvic fx, ER visit on 9/5/22, son states pt fell on buttock at nursing home 9/3/22        Patient is here today for evaluation of a left hip injury.  She had a fall at her nursing home on September 5th.  She was evaluated at the emergency room and had x-rays of her hip as well as CT scan of her pelvis.  She was referred to our office for management of a pelvic fracture.  She complains of pain in her left groin.  Her son states that she is nonambulatory at baseline and has been this way for years.  Nursing home staff does lift her and place her in a wheelchair at times but she is unable to stand for transfers or ambulate at all.  No other injuries or complaints were reported.      Review of Systems   Constitutional: Negative for chills and fever.   HENT:  Negative for congestion and hearing loss.    Eyes:  Negative for visual disturbance.   Cardiovascular:  Negative for chest pain and syncope.   Respiratory:  Negative for cough and shortness of breath.    Hematologic/Lymphatic: Does not bruise/bleed easily.   Skin:  Negative for color change and rash.   Gastrointestinal:  Negative for abdominal pain, nausea and vomiting.   Genitourinary:  Negative for dysuria and hematuria.   Neurological:  Negative for numbness, sensory change and weakness.   Psychiatric/Behavioral:  Negative for altered mental status.       Current Outpatient Medications on File Prior to Visit   Medication Sig Dispense Refill    albuterol-ipratropium (DUO-NEB) 2.5 mg-0.5 mg/3 mL nebulizer solution Take 3 mLs by nebulization every 6 (six) hours as needed for Wheezing. Rescue      alendronate (FOSAMAX) 70 MG tablet Take 70 mg by mouth every 7 days.      ascorbic acid, vitamin C, (VITAMIN C) 1000 MG tablet Take 1,000 mg by mouth once daily.      calcium carbonate (OS-LALO) 600 mg calcium (1,500 mg)  "Tab Take 600 mg by mouth once.      carvediloL (COREG) 3.125 MG tablet Take 3.125 mg by mouth 2 (two) times daily with meals.      citalopram (CELEXA) 10 MG tablet Take 10 mg by mouth once daily.      ezetimibe-simvastatin 10-40 mg (VYTORIN) 10-40 mg per tablet Take 1 tablet by mouth every evening.      furosemide (LASIX) 20 MG tablet Take 20 mg by mouth 2 (two) times daily.      gabapentin (NEURONTIN) 100 MG capsule Take 100 mg by mouth every evening.      glimepiride (AMARYL) 1 MG tablet Take 1 mg by mouth before breakfast.      HYDROcodone-acetaminophen (NORCO) 5-325 mg per tablet Take 1 tablet by mouth every 6 (six) hours as needed for Pain. 18 tablet 0    levothyroxine (SYNTHROID) 88 MCG tablet Take 88 mcg by mouth before breakfast.      memantine (NAMENDA XR) 28 mg CSpX Take by mouth.      metFORMIN (GLUCOPHAGE) 500 MG tablet Take 500 mg by mouth 2 (two) times daily with meals.      multivitamin (THERAGRAN) per tablet Take 1 tablet by mouth once daily.      pantoprazole (PROTONIX) 20 MG tablet Take 20 mg by mouth once daily.      rivastigmine 13.3 mg/24 hour PT24 Place onto the skin.      sacubitriL-valsartan (ENTRESTO) 24-26 mg per tablet Take 1 tablet by mouth 2 (two) times daily.      spironolactone (ALDACTONE) 25 MG tablet Take 25 mg by mouth once daily. Half a tablet BID       No current facility-administered medications on file prior to visit.          Objective:      BP (!) 160/81   Pulse 89   Ht 5' 4" (1.626 m)   Wt 100.2 kg (221 lb)   BMI 37.93 kg/m²   Physical Exam  Constitutional:       General: She is not in acute distress.     Appearance: Normal appearance.   HENT:      Head: Normocephalic and atraumatic.      Mouth/Throat:      Mouth: Mucous membranes are moist.   Eyes:      Extraocular Movements: Extraocular movements intact.   Cardiovascular:      Rate and Rhythm: Normal rate.      Pulses: Normal pulses.   Pulmonary:      Effort: Pulmonary effort is normal. No respiratory distress. "   Abdominal:      General: There is no distension.      Palpations: Abdomen is soft.      Tenderness: There is no abdominal tenderness.   Musculoskeletal:      Cervical back: Normal range of motion and neck supple.      Comments: Left lower extremity: She complains of pain with attempted range of motion of the hip.  Skin is intact.  Thigh is soft and compressible.  No tenderness over long bones.  No pain with knee range of motion.  No calf tenderness.  She can actively dorsiflex and plantar flex her foot.  Brisk capillary refill distally.   Neurological:      Mental Status: She is alert and oriented to person, place, and time. Mental status is at baseline.   Psychiatric:         Mood and Affect: Mood normal.         Behavior: Behavior normal.         Thought Content: Thought content normal.         Judgment: Judgment normal.      Body mass index is 37.93 kg/m².    Radiology:  CT scan of the pelvis done in the emergency room was reviewed.  She has a both column left acetabulum fracture with alignment amenable to nonoperative management.  Concentric hip reduction.  Nondisplaced inferior left ramus fracture.    X-rays of the pelvis done in the office today demonstrate stable alignment compared to her injury films.        Assessment:         1. Closed pelvic ring fracture, initial encounter  X-Ray Pelvis AP In[place] And Outlet      2. Displaced fracture of anterior column (iliopubic) of left acetabulum, initial encounter for closed fracture        3. Closed displaced fracture of posterior column of left acetabulum, initial encounter                Plan:     Patient is an 86-year-old female who is nonambulatory at baseline who sustained a left acetabulum fracture.  Imaging was reviewed and her injury is stable with alignment amenable to nonoperative management.  She will need to be strict nonweightbearing to the left leg.  She can perform range of motion as tolerated.  We will plan to repeat x-rays in 1 month.  Orders and  plan of care was provided for her nursing home.  All questions were addressed with her son.    The above findings, diagnosis, and treatment plan were discussed with Dr. Rancho Braxton who is in agreement.      Follow up in about 4 weeks (around 10/18/2022).    Closed pelvic ring fracture, initial encounter  -     X-Ray Pelvis AP In[place] And Out[place]; Future; Expected date: 09/20/2022    Displaced fracture of anterior column (iliopubic) of left acetabulum, initial encounter for closed fracture    Closed displaced fracture of posterior column of left acetabulum, initial encounter              Orders Placed This Encounter   Procedures    X-Ray Pelvis AP [place]     Standing Status:   Future     Number of Occurrences:   1     Standing Expiration Date:   9/20/2023     Order Specific Question:   May the Radiologist modify the order per protocol to meet the clinical needs of the patient?     Answer:   Yes     Order Specific Question:   Release to patient     Answer:   Immediate       No future appointments.

## 2022-10-18 ENCOUNTER — HOSPITAL ENCOUNTER (OUTPATIENT)
Dept: RADIOLOGY | Facility: CLINIC | Age: 87
Discharge: HOME | End: 2022-10-18
Attending: ORTHOPAEDIC SURGERY
Payer: MEDICARE

## 2022-10-18 ENCOUNTER — OFFICE VISIT (OUTPATIENT)
Dept: ORTHOPEDICS | Facility: CLINIC | Age: 87
End: 2022-10-18
Payer: MEDICARE

## 2022-10-18 VITALS
SYSTOLIC BLOOD PRESSURE: 145 MMHG | BODY MASS INDEX: 37.73 KG/M2 | WEIGHT: 221 LBS | HEART RATE: 60 BPM | DIASTOLIC BLOOD PRESSURE: 60 MMHG | HEIGHT: 64 IN

## 2022-10-18 DIAGNOSIS — S32.432D: ICD-10-CM

## 2022-10-18 DIAGNOSIS — S32.442D CLOSED DISPLACED FRACTURE OF POSTERIOR COLUMN OF LEFT ACETABULUM WITH ROUTINE HEALING, SUBSEQUENT ENCOUNTER: ICD-10-CM

## 2022-10-18 DIAGNOSIS — S32.810D CLOSED PELVIC RING FRACTURE WITH ROUTINE HEALING, SUBSEQUENT ENCOUNTER: ICD-10-CM

## 2022-10-18 DIAGNOSIS — S32.810D CLOSED PELVIC RING FRACTURE WITH ROUTINE HEALING, SUBSEQUENT ENCOUNTER: Primary | ICD-10-CM

## 2022-10-18 PROCEDURE — 72190 X-RAY EXAM OF PELVIS: CPT | Mod: ,,, | Performed by: ORTHOPAEDIC SURGERY

## 2022-10-18 PROCEDURE — 72190 XR PELVIS COMPLETE MIN 3 VIEWS: ICD-10-PCS | Mod: ,,, | Performed by: ORTHOPAEDIC SURGERY

## 2022-10-18 PROCEDURE — 99024 PR POST-OP FOLLOW-UP VISIT: ICD-10-PCS | Mod: POP,,, | Performed by: ORTHOPAEDIC SURGERY

## 2022-10-18 PROCEDURE — 99024 POSTOP FOLLOW-UP VISIT: CPT | Mod: POP,,, | Performed by: ORTHOPAEDIC SURGERY

## 2022-10-20 NOTE — PROGRESS NOTES
Subjective:       Patient ID: Fernanda Oconnor is a 86 y.o. female.    Chief Complaint   Patient presents with    Hip Pain     6 wks. Left both column acetabulum fx. in wheelchair NWB. complaints of pain.         Patient is here for follow-up evaluation status post left acetabulum fracture managed non operatively.  She has no complaints of pain today.  She states she has not been walking.  She is in a wheelchair and is a resident in a facility.  No family available today, she is with her transporter only.    Hip Pain   Pertinent negatives include no fever or numbness.     Review of Systems   Constitutional: Negative for chills, fever and malaise/fatigue.   HENT:  Negative for congestion and hearing loss.    Eyes:  Negative for visual disturbance.   Cardiovascular:  Negative for chest pain and syncope.   Respiratory:  Negative for cough and shortness of breath.    Hematologic/Lymphatic: Does not bruise/bleed easily.   Skin:  Negative for color change and suspicious lesions.   Musculoskeletal:  Negative for falls and neck pain.   Gastrointestinal:  Negative for abdominal pain, nausea and vomiting.   Genitourinary:  Negative for dysuria and hematuria.   Neurological:  Negative for numbness and sensory change.   Psychiatric/Behavioral:  Negative for altered mental status. The patient is not nervous/anxious.       Current Outpatient Medications on File Prior to Visit   Medication Sig Dispense Refill    albuterol-ipratropium (DUO-NEB) 2.5 mg-0.5 mg/3 mL nebulizer solution Take 3 mLs by nebulization every 6 (six) hours as needed for Wheezing. Rescue      alendronate (FOSAMAX) 70 MG tablet Take 70 mg by mouth every 7 days.      ascorbic acid, vitamin C, (VITAMIN C) 1000 MG tablet Take 1,000 mg by mouth once daily.      calcium carbonate (OS-LALO) 600 mg calcium (1,500 mg) Tab Take 600 mg by mouth once.      carvediloL (COREG) 3.125 MG tablet Take 3.125 mg by mouth 2 (two) times daily with meals.      citalopram (CELEXA) 10 MG  "tablet Take 10 mg by mouth once daily.      ezetimibe-simvastatin 10-40 mg (VYTORIN) 10-40 mg per tablet Take 1 tablet by mouth every evening.      furosemide (LASIX) 20 MG tablet Take 20 mg by mouth 2 (two) times daily.      gabapentin (NEURONTIN) 100 MG capsule Take 100 mg by mouth every evening.      glimepiride (AMARYL) 1 MG tablet Take 1 mg by mouth before breakfast.      HYDROcodone-acetaminophen (NORCO) 5-325 mg per tablet Take 1 tablet by mouth every 6 (six) hours as needed for Pain. 18 tablet 0    levothyroxine (SYNTHROID) 88 MCG tablet Take 88 mcg by mouth before breakfast.      memantine (NAMENDA XR) 28 mg CSpX Take by mouth.      metFORMIN (GLUCOPHAGE) 500 MG tablet Take 500 mg by mouth 2 (two) times daily with meals.      multivitamin (THERAGRAN) per tablet Take 1 tablet by mouth once daily.      pantoprazole (PROTONIX) 20 MG tablet Take 20 mg by mouth once daily.      rivastigmine 13.3 mg/24 hour PT24 Place onto the skin.      sacubitriL-valsartan (ENTRESTO) 24-26 mg per tablet Take 1 tablet by mouth 2 (two) times daily.      spironolactone (ALDACTONE) 25 MG tablet Take 25 mg by mouth once daily. Half a tablet BID       No current facility-administered medications on file prior to visit.          Objective:      BP (!) 145/60   Pulse 60   Ht 5' 4" (1.626 m)   Wt 100.2 kg (221 lb)   BMI 37.93 kg/m²   Physical Exam  Musculoskeletal:      Comments: Left lower extremity:  She has generalized tenderness throughout the limb with mild edema.  She allows me to perform gentle passive circumduction of the left hip without significant discomfort.  She is able to move her ankle and digits.  No calf swelling or tenderness, no signs of DVT.      Body mass index is 37.93 kg/m².    Radiology:   Pelvis three views:  X-rays reveals no displacement of the left acetabulum fracture compared to previous films..  Her hip joint remains congruent      Assessment:         1. Closed pelvic ring fracture with routine healing, " subsequent encounter  X-Ray Pelvis Complete min 3 views    CANCELED: X-Ray Pelvis AP In[place] And Outlet      2. Closed displaced fracture of posterior column of left acetabulum with routine healing, subsequent encounter  CANCELED: X-Ray Pelvis [place] Views      3. Closed fracture of anterior column of acetabulum, left, with routine healing, subsequent encounter  CANCELED: X-Ray Pelvis [place] Views              Plan:       She is doing well from happy with her progress.  Starting in 2 weeks will our begin stand transfers.  She will return to see me in 6 weeks at that time we will release her to full activities without restrictions.    Rancho Braxton MD  Orthopedic Trauma  [facility]      Follow up in about 6 weeks (around 11/29/2022).    Closed pelvic ring fracture with routine healing, subsequent encounter  -     Cancel: X-Ray Pelvis AP In[place] And Out[place]; Future; Expected date: 10/18/2022  -     X-Ray Pelvis Complete min 3 views; Future; Expected date: 10/18/2022    Closed displaced fracture of posterior column of left acetabulum with routine healing, subsequent encounter  -     Cancel: X-Ray Pelvis [place] Views; Future; Expected date: 10/18/2022    Closed fracture of anterior column of acetabulum, left, with routine healing, subsequent encounter  -     Cancel: X-Ray Pelvis [place] Views; Future; Expected date: 10/18/2022              Orders Placed This Encounter   Procedures    X-Ray Pelvis Complete min 3 views     Standing Status:   Future     Number of Occurrences:   1     Standing Expiration Date:   10/17/2023     Order Specific Question:   May the Radiologist modify the order per protocol to meet the clinical needs of the patient?     Answer:   Yes     Order Specific Question:   Release to patient     Answer:   Immediate       Future Appointments   Date Time Provider Department Center   11/29/2022  9:30 AM Rancho Braxton MD [facility]

## 2022-12-05 ENCOUNTER — HOSPITAL ENCOUNTER (OUTPATIENT)
Dept: RADIOLOGY | Facility: CLINIC | Age: 87
Discharge: HOME | End: 2022-12-05
Attending: ORTHOPAEDIC SURGERY
Payer: MEDICARE

## 2022-12-05 ENCOUNTER — OFFICE VISIT (OUTPATIENT)
Dept: ORTHOPEDICS | Facility: CLINIC | Age: 87
End: 2022-12-05
Payer: MEDICARE

## 2022-12-05 VITALS — WEIGHT: 221 LBS | HEIGHT: 64 IN | BODY MASS INDEX: 37.73 KG/M2

## 2022-12-05 DIAGNOSIS — S32.810D CLOSED PELVIC RING FRACTURE WITH ROUTINE HEALING, SUBSEQUENT ENCOUNTER: ICD-10-CM

## 2022-12-05 DIAGNOSIS — S32.442D CLOSED DISPLACED FRACTURE OF POSTERIOR COLUMN OF LEFT ACETABULUM WITH ROUTINE HEALING, SUBSEQUENT ENCOUNTER: ICD-10-CM

## 2022-12-05 DIAGNOSIS — S32.810D CLOSED PELVIC RING FRACTURE WITH ROUTINE HEALING, SUBSEQUENT ENCOUNTER: Primary | ICD-10-CM

## 2022-12-05 PROCEDURE — 72190 XR PELVIS COMPLETE MIN 3 VIEWS: ICD-10-PCS | Mod: ,,, | Performed by: ORTHOPAEDIC SURGERY

## 2022-12-05 PROCEDURE — 99024 POSTOP FOLLOW-UP VISIT: CPT | Mod: ,,, | Performed by: NURSE PRACTITIONER

## 2022-12-05 PROCEDURE — 72190 X-RAY EXAM OF PELVIS: CPT | Mod: ,,, | Performed by: ORTHOPAEDIC SURGERY

## 2022-12-05 PROCEDURE — 99024 PR POST-OP FOLLOW-UP VISIT: ICD-10-PCS | Mod: ,,, | Performed by: NURSE PRACTITIONER

## 2022-12-05 NOTE — PROGRESS NOTES
Subjective:       Patient ID: Fernanda Oconnor is a 87 y.o. female.    Chief Complaint   Patient presents with    Pelvis - Follow-up     3  Month f/u from column acetabulum fx. In wheelchair. Overall doing well.         The patient is here today for a follow-up evaluation 3 months out from a left acetabulum fracture treated non operatively.  The patient states she is doing well today.  She states she has improved since her previous visit.  She denies any pain to her hip today.  She states she has been working with physical therapy and is able to stand for transfers with assistance.  Otherwise, she gets around in her wheelchair and has not been walking.  No new issues were reported today.      Review of Systems   Constitutional: Negative for chills and fever.   HENT:  Negative for congestion and hearing loss.    Eyes:  Negative for visual disturbance.   Cardiovascular:  Negative for chest pain and syncope.   Respiratory:  Negative for cough and shortness of breath.    Hematologic/Lymphatic: Does not bruise/bleed easily.   Skin:  Negative for color change and rash.   Gastrointestinal:  Negative for abdominal pain, nausea and vomiting.   Genitourinary:  Negative for dysuria and hematuria.   Neurological:  Negative for numbness, sensory change and weakness.   Psychiatric/Behavioral:  Negative for altered mental status.       Current Outpatient Medications on File Prior to Visit   Medication Sig Dispense Refill    albuterol-ipratropium (DUO-NEB) 2.5 mg-0.5 mg/3 mL nebulizer solution Take 3 mLs by nebulization every 6 (six) hours as needed for Wheezing. Rescue      alendronate (FOSAMAX) 70 MG tablet Take 70 mg by mouth every 7 days.      ascorbic acid, vitamin C, (VITAMIN C) 1000 MG tablet Take 1,000 mg by mouth once daily.      calcium carbonate (OS-LALO) 600 mg calcium (1,500 mg) Tab Take 600 mg by mouth once.      carvediloL (COREG) 3.125 MG tablet Take 3.125 mg by mouth 2 (two) times daily with meals.      citalopram  "(CELEXA) 10 MG tablet Take 10 mg by mouth once daily.      ezetimibe-simvastatin 10-40 mg (VYTORIN) 10-40 mg per tablet Take 1 tablet by mouth every evening.      furosemide (LASIX) 20 MG tablet Take 20 mg by mouth 2 (two) times daily.      gabapentin (NEURONTIN) 100 MG capsule Take 100 mg by mouth every evening.      glimepiride (AMARYL) 1 MG tablet Take 1 mg by mouth before breakfast.      HYDROcodone-acetaminophen (NORCO) 5-325 mg per tablet Take 1 tablet by mouth every 6 (six) hours as needed for Pain. 18 tablet 0    levothyroxine (SYNTHROID) 88 MCG tablet Take 88 mcg by mouth before breakfast.      memantine (NAMENDA XR) 28 mg CSpX Take by mouth.      metFORMIN (GLUCOPHAGE) 500 MG tablet Take 500 mg by mouth 2 (two) times daily with meals.      multivitamin (THERAGRAN) per tablet Take 1 tablet by mouth once daily.      pantoprazole (PROTONIX) 20 MG tablet Take 20 mg by mouth once daily.      rivastigmine 13.3 mg/24 hour PT24 Place onto the skin.      sacubitriL-valsartan (ENTRESTO) 24-26 mg per tablet Take 1 tablet by mouth 2 (two) times daily.      spironolactone (ALDACTONE) 25 MG tablet Take 25 mg by mouth once daily. Half a tablet BID       No current facility-administered medications on file prior to visit.          Objective:      Ht 5' 4" (1.626 m)   Wt 100.2 kg (221 lb)   LMP  (LMP Unknown)   BMI 37.93 kg/m²   Physical Exam  Musculoskeletal:      Comments: Left lower extremity: Skin is intact.  She tolerates passive range of motion of the hip without pain.  No obvious swelling to the lower extremity.  No calf tenderness.  She can actively dorsiflex and plantar flex her foot.  Brisk capillary refill distally.  Sensation to light touch intact distally.      Body mass index is 37.93 kg/m².    Radiology:  3 view x-ray pelvis reveals no displacement of the left acetabulum fracture compared to previous films; hip joint remains congruent; interval bone healing noted        Assessment:         1. Closed " pelvic ring fracture with routine healing, subsequent encounter  CANCELED: X-Ray Pelvis [place] [facility]    CANCELED: X-Ray Pelvis [facility] [place]      2. Closed displaced fracture of posterior column of left acetabulum with routine healing, subsequent encounter  CANCELED: X-Ray Pelvis [place] Views    CANCELED: X-Ray Pelvis [facility] [place]              Plan:       Patient is doing well today 3 months out from a left acetabulum fracture treated non operatively.  X-rays demonstrate stable fracture alignment with good interval bone healing.  We will allow her to advance weight-bearing as tolerated for ambulation at this time.  Continue full range of motion.  Continue physical therapy.  Updated orders were provided for her facility.  Follow up in 2 months for repeat x-rays and evaluation.    The above findings, diagnosis, and treatment plan were discussed with Dr. Rancho Braxton who is in agreement.    Follow up in about 2 months (around 2/5/2023).    Closed pelvic ring fracture with routine healing, subsequent encounter  -     Cancel: X-Ray Pelvis [facility] Views; Future; Expected date: 12/05/2022  -     Cancel: X-Ray Pelvis [place]; Future; Expected date: 12/05/2022    Closed displaced fracture of posterior column of left acetabulum with routine healing, subsequent encounter  -     Cancel: X-Ray Pelvis [place] [facility]; Future; Expected date: 12/05/2022  -     Cancel: X-Ray Pelvis [place]; Future; Expected date: 12/05/2022              No orders of the defined types were placed in this encounter.      Future Appointments   Date Time Provider Department Center   2/7/2023 10:15 AM Rancho Braxton MD [facility] [place]

## 2023-01-23 ENCOUNTER — LAB REQUISITION (OUTPATIENT)
Dept: LAB | Facility: HOSPITAL | Age: 88
End: 2023-01-23
Payer: MEDICARE

## 2023-01-23 DIAGNOSIS — U07.1 COVID-19: ICD-10-CM

## 2023-01-23 DIAGNOSIS — U07.1 COVID-19 VIRUS DETECTED: ICD-10-CM

## 2023-01-23 LAB — SARS-COV-2 RNA RESP QL NAA+PROBE: DETECTED

## 2023-01-23 PROCEDURE — 87635 SARS-COV-2 COVID-19 AMP PRB: CPT | Performed by: INTERNAL MEDICINE

## 2023-02-07 ENCOUNTER — HOSPITAL ENCOUNTER (OUTPATIENT)
Dept: RADIOLOGY | Facility: CLINIC | Age: 88
Discharge: HOME | End: 2023-02-07
Attending: ORTHOPAEDIC SURGERY
Payer: MEDICARE

## 2023-02-07 ENCOUNTER — OFFICE VISIT (OUTPATIENT)
Dept: ORTHOPEDICS | Facility: CLINIC | Age: 88
End: 2023-02-07
Payer: MEDICARE

## 2023-02-07 VITALS
HEART RATE: 60 BPM | BODY MASS INDEX: 37.71 KG/M2 | TEMPERATURE: 98 F | WEIGHT: 220.88 LBS | RESPIRATION RATE: 18 BRPM | HEIGHT: 64 IN | DIASTOLIC BLOOD PRESSURE: 54 MMHG | SYSTOLIC BLOOD PRESSURE: 134 MMHG

## 2023-02-07 DIAGNOSIS — S32.810D CLOSED PELVIC RING FRACTURE WITH ROUTINE HEALING, SUBSEQUENT ENCOUNTER: ICD-10-CM

## 2023-02-07 DIAGNOSIS — S32.442D CLOSED DISPLACED FRACTURE OF POSTERIOR COLUMN OF LEFT ACETABULUM WITH ROUTINE HEALING, SUBSEQUENT ENCOUNTER: Primary | ICD-10-CM

## 2023-02-07 PROCEDURE — 72190 XR PELVIS COMPLETE MIN 3 VIEWS: ICD-10-PCS | Mod: ,,, | Performed by: ORTHOPAEDIC SURGERY

## 2023-02-07 PROCEDURE — 99213 OFFICE O/P EST LOW 20 MIN: CPT | Mod: ,,, | Performed by: ORTHOPAEDIC SURGERY

## 2023-02-07 PROCEDURE — 99213 PR OFFICE/OUTPT VISIT, EST, LEVL III, 20-29 MIN: ICD-10-PCS | Mod: ,,, | Performed by: ORTHOPAEDIC SURGERY

## 2023-02-07 PROCEDURE — 72190 X-RAY EXAM OF PELVIS: CPT | Mod: ,,, | Performed by: ORTHOPAEDIC SURGERY

## 2023-02-07 NOTE — PROGRESS NOTES
Subjective:       Patient ID: Fernanda Oconnor is a 87 y.o. female.    Chief Complaint   Patient presents with    Pelvis - Follow-up     5 MONTH F/U LEFT ACETABULUM FX, NONAMBULATOR.          Patient is here today for follow-up evaluation status post closed management left acetabulum fracture.  She reports she has no pain in her left hip.  No new complaints reported.    Follow-up  Pertinent negatives include no abdominal pain, chest pain, chills, congestion, coughing, fever, nausea, neck pain, numbness or vomiting.     Review of Systems   Constitutional: Negative for chills, fever and malaise/fatigue.   HENT:  Negative for congestion and hearing loss.    Eyes:  Negative for visual disturbance.   Cardiovascular:  Negative for chest pain and syncope.   Respiratory:  Negative for cough and shortness of breath.    Hematologic/Lymphatic: Does not bruise/bleed easily.   Skin:  Negative for color change and suspicious lesions.   Musculoskeletal:  Negative for falls and neck pain.   Gastrointestinal:  Negative for abdominal pain, nausea and vomiting.   Genitourinary:  Negative for dysuria and hematuria.   Neurological:  Negative for numbness and sensory change.   Psychiatric/Behavioral:  Negative for altered mental status. The patient is not nervous/anxious.       Current Outpatient Medications on File Prior to Visit   Medication Sig Dispense Refill    albuterol-ipratropium (DUO-NEB) 2.5 mg-0.5 mg/3 mL nebulizer solution Take 3 mLs by nebulization every 6 (six) hours as needed for Wheezing. Rescue      alendronate (FOSAMAX) 70 MG tablet Take 70 mg by mouth every 7 days.      ascorbic acid, vitamin C, (VITAMIN C) 1000 MG tablet Take 1,000 mg by mouth once daily.      calcium carbonate (OS-LALO) 600 mg calcium (1,500 mg) Tab Take 600 mg by mouth once.      carvediloL (COREG) 3.125 MG tablet Take 3.125 mg by mouth 2 (two) times daily with meals.      citalopram (CELEXA) 10 MG tablet Take 10 mg by mouth once daily.       "ezetimibe-simvastatin 10-40 mg (VYTORIN) 10-40 mg per tablet Take 1 tablet by mouth every evening.      furosemide (LASIX) 20 MG tablet Take 20 mg by mouth 2 (two) times daily.      gabapentin (NEURONTIN) 100 MG capsule Take 100 mg by mouth every evening.      glimepiride (AMARYL) 1 MG tablet Take 1 mg by mouth before breakfast.      HYDROcodone-acetaminophen (NORCO) 5-325 mg per tablet Take 1 tablet by mouth every 6 (six) hours as needed for Pain. 18 tablet 0    levothyroxine (SYNTHROID) 88 MCG tablet Take 88 mcg by mouth before breakfast.      memantine (NAMENDA XR) 28 mg CSpX Take by mouth.      metFORMIN (GLUCOPHAGE) 500 MG tablet Take 500 mg by mouth 2 (two) times daily with meals.      multivitamin (THERAGRAN) per tablet Take 1 tablet by mouth once daily.      pantoprazole (PROTONIX) 20 MG tablet Take 20 mg by mouth once daily.      rivastigmine 13.3 mg/24 hour PT24 Place onto the skin.      sacubitriL-valsartan (ENTRESTO) 24-26 mg per tablet Take 1 tablet by mouth 2 (two) times daily.      spironolactone (ALDACTONE) 25 MG tablet Take 25 mg by mouth once daily. Half a tablet BID       No current facility-administered medications on file prior to visit.          Objective:      BP (!) 134/54   Pulse 60   Temp 98 °F (36.7 °C)   Resp 18   Ht 5' 4" (1.626 m)   Wt 100.2 kg (220 lb 14.4 oz)   BMI 37.92 kg/m²   Physical Exam  Constitutional:       General: She is not in acute distress.     Appearance: Normal appearance. She is not ill-appearing.   HENT:      Head: Normocephalic and atraumatic.      Nose: No congestion.   Eyes:      Extraocular Movements: Extraocular movements intact.   Cardiovascular:      Rate and Rhythm: Normal rate and regular rhythm.      Pulses: Normal pulses.   Pulmonary:      Effort: Pulmonary effort is normal.      Breath sounds: Normal breath sounds.   Abdominal:      General: There is no distension.      Palpations: Abdomen is soft.      Tenderness: There is no abdominal tenderness. "   Musculoskeletal:      Comments: Left lower extremity:  Tolerates passive circumduction of the left hip without pain.  Full range motion of the knee ankle and digits.  Neurovascularly intact distally.  No calf swelling or tenderness, no signs of DVT.   Skin:     General: Skin is warm and dry.   Neurological:      Mental Status: She is alert and oriented to person, place, and time. Mental status is at baseline.   Psychiatric:         Mood and Affect: Mood normal.         Behavior: Behavior normal.         Thought Content: Thought content normal.         Judgment: Judgment normal.      Body mass index is 37.92 kg/m².    Radiology:   Pelvis three views:  Alignment unchanged compared to previous films.  Fracture completely consolidated.  Good maintenance of joint space in the left hip.  Prior hardware in the left proximal femur intact.      Assessment:         1. Closed displaced fracture of posterior column of left acetabulum with routine healing, subsequent encounter  CANCELED: X-Ray Pelvis [facility] Views      2. Closed pelvic ring fracture with routine healing, subsequent encounter  X-Ray Pelvis Complete min 3 views    CANCELED: X-Ray Pelvis [facility] [place]              Plan:       She is doing well and her fracture is completely consolidated.  She has no complaints of any pain in the left hip.  She can continue full activities without restrictions.  She can follow-up with me on an as-needed basis should any new issues arise for her in the future.    Rancho Braxton MD  Orthopedic Trauma  [facility]      Follow up if symptoms worsen or fail to improve.    Closed displaced fracture of posterior column of left acetabulum with routine healing, subsequent encounter  -     Cancel: X-Ray Pelvis [place] Views; Future; Expected date: 02/07/2023    Closed pelvic ring fracture with routine healing, subsequent encounter  -     Cancel: X-Ray Pelvis [place]; Future; Expected date: 02/07/2023  -      X-Ray Pelvis Complete min 3 views; Future; Expected date: 02/07/2023              Orders Placed This Encounter   Procedures    X-Ray Pelvis Complete min 3 views     Standing Status:   Future     Number of Occurrences:   1     Standing Expiration Date:   2/3/2024     Order Specific Question:   May the Radiologist modify the order per protocol to meet the clinical needs of the patient?     Answer:   Yes     Order Specific Question:   Release to patient     Answer:   Immediate       No future appointments.

## 2023-08-13 ENCOUNTER — LAB REQUISITION (OUTPATIENT)
Dept: LAB | Facility: HOSPITAL | Age: 88
End: 2023-08-13
Payer: MEDICARE

## 2023-08-13 DIAGNOSIS — E11.40 TYPE 2 DIABETES MELLITUS WITH DIABETIC NEUROPATHY, UNSPECIFIED: ICD-10-CM

## 2023-08-13 LAB
ANION GAP SERPL CALC-SCNC: 11 MEQ/L
BUN SERPL-MCNC: 42 MG/DL (ref 9.8–20.1)
CALCIUM SERPL-MCNC: 9.5 MG/DL (ref 8.4–10.2)
CHLORIDE SERPL-SCNC: 108 MMOL/L (ref 98–107)
CO2 SERPL-SCNC: 19 MMOL/L (ref 23–31)
CREAT SERPL-MCNC: 1.58 MG/DL (ref 0.55–1.02)
CREAT/UREA NIT SERPL: 27
GFR SERPLBLD CREATININE-BSD FMLA CKD-EPI: 32 MLS/MIN/1.73/M2
GLUCOSE SERPL-MCNC: 169 MG/DL (ref 82–115)
POTASSIUM SERPL-SCNC: 5.2 MMOL/L (ref 3.5–5.1)
SODIUM SERPL-SCNC: 138 MMOL/L (ref 136–145)

## 2023-08-13 PROCEDURE — 80048 BASIC METABOLIC PNL TOTAL CA: CPT | Performed by: INTERNAL MEDICINE

## 2023-08-30 DIAGNOSIS — N18.9 CHRONIC KIDNEY DISEASE, UNSPECIFIED: Primary | ICD-10-CM

## 2023-08-31 ENCOUNTER — HOSPITAL ENCOUNTER (OUTPATIENT)
Dept: RADIOLOGY | Facility: HOSPITAL | Age: 88
Discharge: HOME | End: 2023-08-31
Attending: NURSE PRACTITIONER
Payer: MEDICARE

## 2023-08-31 DIAGNOSIS — N18.9 CHRONIC KIDNEY DISEASE, UNSPECIFIED: ICD-10-CM

## 2023-08-31 PROCEDURE — 76770 US EXAM ABDO BACK WALL COMP: CPT | Mod: TC

## 2024-09-26 ENCOUNTER — LAB REQUISITION (OUTPATIENT)
Dept: LAB | Facility: HOSPITAL | Age: 89
End: 2024-09-26
Payer: MEDICARE

## 2024-09-26 DIAGNOSIS — U07.1 COVID-19 VIRUS DETECTED: ICD-10-CM

## 2024-09-26 DIAGNOSIS — R05.1 ACUTE COUGH: ICD-10-CM

## 2024-09-26 LAB — SARS-COV-2 RDRP RESP QL NAA+PROBE: POSITIVE

## 2024-09-26 PROCEDURE — U0002 COVID-19 LAB TEST NON-CDC: HCPCS | Performed by: INTERNAL MEDICINE
